# Patient Record
Sex: FEMALE | Race: WHITE | URBAN - NONMETROPOLITAN AREA
[De-identification: names, ages, dates, MRNs, and addresses within clinical notes are randomized per-mention and may not be internally consistent; named-entity substitution may affect disease eponyms.]

---

## 2017-11-25 LAB
BUN BLDV-MCNC: 13 MG/DL
CALCIUM SERPL-MCNC: 8.7 MG/DL
CHLORIDE BLD-SCNC: 101 MMOL/L
CO2: 27 MMOL/L
CREAT SERPL-MCNC: 0.8 MG/DL
CREATININE, URINE: 57
GFR CALCULATED: >60
GLUCOSE BLD-MCNC: 93 MG/DL
MICROALBUMIN/CREAT 24H UR: 8.9 MG/G{CREAT}
MICROALBUMIN/CREAT UR-RTO: 16
POTASSIUM SERPL-SCNC: 4.2 MMOL/L
SODIUM BLD-SCNC: 136 MMOL/L

## 2017-12-06 ENCOUNTER — OFFICE VISIT (OUTPATIENT)
Dept: NEPHROLOGY | Age: 52
End: 2017-12-06
Payer: COMMERCIAL

## 2017-12-06 VITALS
WEIGHT: 119.7 LBS | SYSTOLIC BLOOD PRESSURE: 114 MMHG | DIASTOLIC BLOOD PRESSURE: 68 MMHG | OXYGEN SATURATION: 98 % | HEART RATE: 84 BPM

## 2017-12-06 DIAGNOSIS — N02.0 IDIOPATHIC HEMATURIA WITH MINOR GLOMERULAR ABNORMALITY: Primary | ICD-10-CM

## 2017-12-06 PROCEDURE — 99213 OFFICE O/P EST LOW 20 MIN: CPT | Performed by: INTERNAL MEDICINE

## 2017-12-06 ASSESSMENT — ENCOUNTER SYMPTOMS
GASTROINTESTINAL NEGATIVE: 1
RESPIRATORY NEGATIVE: 1

## 2017-12-06 NOTE — PROGRESS NOTES
Visit Date: 12/6/2017      HPI:     Radha Gilliland is a 47 y.o. female who presents today for:  Chief Complaint   Patient presents with    Chronic Kidney Disease     Stage II    Hematuria       Current Outpatient Prescriptions   Medication Sig Dispense Refill    Norgestimate-Eth Estradiol (SPRINTEC 28 PO) Take  by mouth daily. No current facility-administered medications for this visit. Allergies   Allergen Reactions    Ibuprofen Other (See Comments)     Knocks her out and cannot wake up    Pcn [Penicillins]        Past Medical History:   Diagnosis Date    Hematuria       History reviewed. No pertinent surgical history. Family History   Problem Relation Age of Onset    Stroke Mother     Alzheimer's Disease Father      Social History   Substance Use Topics    Smoking status: Never Smoker    Smokeless tobacco: Never Used    Alcohol use Not on file        Subjective:      Review of Systems   Constitutional: Negative. HENT: Negative. Respiratory: Negative. Cardiovascular: Negative. Gastrointestinal: Negative. Genitourinary: Negative. Musculoskeletal: Negative. Skin: Negative. Neurological: Negative. Psychiatric/Behavioral: Negative. Objective:     /68 (Site: Left Arm, Position: Sitting)   Pulse 84   Wt 119 lb 11.2 oz (54.3 kg)   SpO2 98%     Physical Exam   Constitutional: She is oriented to person, place, and time. She appears well-developed and well-nourished. HENT:   Head: Normocephalic. Eyes: Right eye exhibits no discharge. Left eye exhibits no discharge. Neck: Normal range of motion. No JVD present. Cardiovascular: Normal rate, regular rhythm, normal heart sounds and intact distal pulses. Exam reveals no gallop and no friction rub. No murmur heard. Pulmonary/Chest: Effort normal and breath sounds normal. No respiratory distress. She has no wheezes. She has no rales. She exhibits no tenderness. Abdominal: Soft.  Bowel sounds are normal. She exhibits no distension. There is no tenderness. Musculoskeletal: Normal range of motion. She exhibits no edema or tenderness. Lymphadenopathy:     She has no cervical adenopathy. Neurological: She is alert and oriented to person, place, and time. No cranial nerve deficit. Coordination normal.   Skin: Skin is warm and dry. No rash noted. No pallor. Psychiatric: She has a normal mood and affect. Her behavior is normal. Judgment and thought content normal.   Nursing note and vitals reviewed. CBC: No results found for: WBC, HGB, HCT, MCV, PLT  BMP:    Lab Results   Component Value Date     11/25/2017     11/26/2016     11/21/2015    K 4.2 11/25/2017    K 4.2 11/26/2016    K 4.1 11/21/2015     11/25/2017     11/26/2016     11/21/2015    CO2 27 11/25/2017    CO2 30 11/26/2016    CO2 30 11/21/2015    BUN 13 11/25/2017    BUN 11 11/26/2016    BUN 11 11/21/2015    CREATININE 0.80 11/25/2017    CREATININE 0.81 11/26/2016    CREATININE 0.79 11/21/2015    GLUCOSE 93 11/25/2017    GLUCOSE 85 11/26/2016    GLUCOSE 89 11/21/2015      Hepatic: No results found for: AST, ALT, ALB, BILITOT, ALKPHOS  BNP: No results found for: BNP  Lipids: No results found for: CHOL, HDL  INR: No results found for: INR       URINE: No results found for: NAUR, PROTUR                          No results found for: NITRU, COLORU, PHUR, LABCAST, WBCUA, RBCUA, MUCUS, TRICHOMONAS, YEAST, BACTERIA, CLARITYU, SPECGRAV, LEUKOCYTESUR, UROBILINOGEN, BILIRUBINUR, BLOODU, GLUCOSEU, KETUA, AMORPHOUS      Microalbumen/Creatinine ratio:  No components found for: RUCREAT    Assessment:   Chronic kidney disease stage 2 with microscopic hematuria  IgA?   Thin basement membrane            Plan:   Fu annually   Urinalysis and serial labs  Discussed with patient        Jadyn Dominguez DO

## 2018-11-30 LAB
BUN BLDV-MCNC: 11 MG/DL
CALCIUM SERPL-MCNC: 9.4 MG/DL
CHLORIDE BLD-SCNC: 101 MMOL/L
CO2: 30 MMOL/L
CREAT SERPL-MCNC: 0.79 MG/DL
CREATININE, URINE: 52
GFR CALCULATED: >60
GLUCOSE BLD-MCNC: 96 MG/DL
MICROALBUMIN/CREAT 24H UR: 6.3 MG/G{CREAT}
MICROALBUMIN/CREAT UR-RTO: 12
POTASSIUM SERPL-SCNC: 4.7 MMOL/L
SODIUM BLD-SCNC: 138 MMOL/L

## 2018-12-05 ENCOUNTER — OFFICE VISIT (OUTPATIENT)
Dept: NEPHROLOGY | Age: 53
End: 2018-12-05
Payer: COMMERCIAL

## 2018-12-05 VITALS
OXYGEN SATURATION: 98 % | SYSTOLIC BLOOD PRESSURE: 120 MMHG | DIASTOLIC BLOOD PRESSURE: 80 MMHG | WEIGHT: 121.5 LBS | HEART RATE: 93 BPM

## 2018-12-05 DIAGNOSIS — R31.29 MICROSCOPIC HEMATURIA: Primary | ICD-10-CM

## 2018-12-05 PROBLEM — N18.2 STAGE 2 CHRONIC KIDNEY DISEASE: Status: ACTIVE | Noted: 2017-12-07

## 2018-12-05 PROCEDURE — 99213 OFFICE O/P EST LOW 20 MIN: CPT | Performed by: INTERNAL MEDICINE

## 2018-12-05 RX ORDER — ASCORBIC ACID 250 MG
TABLET ORAL
COMMUNITY

## 2019-11-26 LAB
BUN BLDV-MCNC: 10 MG/DL
CALCIUM SERPL-MCNC: 8.9 MG/DL
CHLORIDE BLD-SCNC: 101 MMOL/L
CO2: 30 MMOL/L
CREAT SERPL-MCNC: 0.85 MG/DL
GFR CALCULATED: >60
GLUCOSE BLD-MCNC: 101 MG/DL
POTASSIUM SERPL-SCNC: 4.7 MMOL/L
SODIUM BLD-SCNC: 138 MMOL/L

## 2019-12-04 ENCOUNTER — OFFICE VISIT (OUTPATIENT)
Dept: NEPHROLOGY | Age: 54
End: 2019-12-04
Payer: COMMERCIAL

## 2019-12-04 VITALS
DIASTOLIC BLOOD PRESSURE: 83 MMHG | OXYGEN SATURATION: 97 % | SYSTOLIC BLOOD PRESSURE: 129 MMHG | HEART RATE: 88 BPM | WEIGHT: 124 LBS

## 2019-12-04 DIAGNOSIS — R31.29 MICROSCOPIC HEMATURIA: Primary | ICD-10-CM

## 2019-12-04 DIAGNOSIS — N28.1 CYST OF RIGHT KIDNEY: ICD-10-CM

## 2019-12-04 PROCEDURE — 99213 OFFICE O/P EST LOW 20 MIN: CPT | Performed by: INTERNAL MEDICINE

## 2020-12-05 LAB
BILIRUBIN, URINE: NEGATIVE
BLOOD, URINE: POSITIVE
BUN BLDV-MCNC: 12 MG/DL
CALCIUM SERPL-MCNC: 9.2 MG/DL
CHLORIDE BLD-SCNC: 102 MMOL/L
CLARITY: CLEAR
CO2: 32 MMOL/L
COLOR: YELLOW
CREAT SERPL-MCNC: 0.65 MG/DL
GFR CALCULATED: >60
GLUCOSE BLD-MCNC: 96 MG/DL
GLUCOSE URINE: NEGATIVE
KETONES, URINE: NEGATIVE
LEUKOCYTE ESTERASE, URINE: POSITIVE
NITRITE, URINE: NEGATIVE
PH UA: 7 (ref 4.5–8)
POTASSIUM SERPL-SCNC: 4.1 MMOL/L
PROTEIN UA: NEGATIVE
SODIUM BLD-SCNC: 138 MMOL/L
SPECIFIC GRAVITY, URINE: 1.02
UROBILINOGEN, URINE: NORMAL

## 2020-12-16 ENCOUNTER — OFFICE VISIT (OUTPATIENT)
Dept: NEPHROLOGY | Age: 55
End: 2020-12-16
Payer: COMMERCIAL

## 2020-12-16 VITALS
HEART RATE: 90 BPM | SYSTOLIC BLOOD PRESSURE: 133 MMHG | TEMPERATURE: 98.2 F | OXYGEN SATURATION: 95 % | WEIGHT: 125.9 LBS | DIASTOLIC BLOOD PRESSURE: 86 MMHG

## 2020-12-16 PROCEDURE — 99212 OFFICE O/P EST SF 10 MIN: CPT | Performed by: INTERNAL MEDICINE

## 2020-12-16 NOTE — PROGRESS NOTES
+[facility]    [facility], [address]   [place] [facility]  [phone]  Progress Note  12/16/2020 12:47 PM      Pt Name:    Warden Hyde  YOB: 1965  Primary Care Physician:  Chelsi Ruggiero DO     Chief Complaint:   Chief Complaint   Patient presents with    Follow-up     MICROSCOPIC HEMATURIA        Background Information/Interval History:   64 yo female who hx microscopic hematuria and renal cyst who is here for yearly follow-up. She was previously following with Dr. Alena Duvall for microscopic hematuria. She is here for one year follow-up. She feels well. No chest pain or shortness of breath. No dysuria. No leg swelling. She feels well. Patient has been seeing Dr. Alena Duvall every year since 2014. I saw her for first time in 2018. Past History:  Past Medical History:   Diagnosis Date    Hematuria      History reviewed. No pertinent surgical history. VITALS:  /86 (Site: Right Upper Arm, Position: Sitting, Cuff Size: Small Adult)   Pulse 90   Temp 98.2 °F (36.8 °C)   Wt 125 lb 14.4 oz (57.1 kg)   SpO2 95%   Wt Readings from Last 3 Encounters:   12/16/20 125 lb 14.4 oz (57.1 kg)   12/04/19 124 lb (56.2 kg)   12/05/18 121 lb 8 oz (55.1 kg)     There is no height or weight on file to calculate BMI.      General Appearance: alert and cooperative with exam, appears comfortable, no distress  Oral: moist oral mucus membranes  Neck: No jugular venous distention  Lungs: Air entry B/L, no crackles or rales, no use of accessory muscles  Heart: S1, S2 heard  GI: soft, non-tender, no guarding  Extremities: No sig LE edema  Flank: No tenderness on palpation     Medications:  Current Outpatient Medications   Medication Sig Dispense Refill    zinc 50 MG CAPS Take 50 mg by mouth daily      VITAMIN D, CHOLECALCIFEROL, PO Take 3,000 Units by mouth daily      ascorbic acid (V-R VITAMIN C) 250 MG tablet Take by mouth      IRON PO Take by mouth      BEE POLLEN PO Take by mouth      Norgestimate-Eth Estradiol (SPRINTEC 28 PO) Take  by mouth daily. No current facility-administered medications for this visit. Laboratory & Diagnostics:  Dec 2019: B/L 10.6 cm, 3.3 cm Right renal cyst  Nov 2018: K 4.7, creat 0.79, UACR 12 mg/g    Nov 2019: K 4.7, creat 0.85, UA: 1+ blood, 0-2 RBC, urine cytology: negative  Dec 2020: K 4.1, Creat 0.65, UA: trace blood, 0-2 RBC     Impression/Plan:   1. Hx microscopic hematuria. Dipstick positive but 0-2 RBC/hpf last year and again this year. Discussed with patient that this is a very stable/normal finding. Reassurance provided. No proteinuria. Renal function is stable. Patient previously refused CT scan. US findings reviewed with patient. 2. Right renal cyst: asymptomatic. 3. Renal: creatinine is stable. OVerall renal function very stable. UA is normal. No RBC seen. Discussed with patient. She can follow with PCP but patient prefers to see me atleast on annual basis. Orders Placed This Encounter   Procedures    Basic Metabolic Panel    Urinalysis with Microscopic    Protein / creatinine ratio, urine     Return in about 1 year (around 12/16/2021).     Donna Arthur MD   [facility]

## 2021-12-04 LAB
BILIRUBIN, URINE: NEGATIVE
BLOOD, URINE: POSITIVE
BUN BLDV-MCNC: 12 MG/DL
CALCIUM SERPL-MCNC: 9.5 MG/DL
CHLORIDE BLD-SCNC: 102 MMOL/L
CLARITY: CLEAR
CO2: 27 MMOL/L
COLOR: YELLOW
CREAT SERPL-MCNC: 0.7 MG/DL
GFR CALCULATED: >60
GLUCOSE BLD-MCNC: 97 MG/DL
GLUCOSE URINE: NEGATIVE
KETONES, URINE: NEGATIVE
LEUKOCYTE ESTERASE, URINE: NEGATIVE
NITRITE, URINE: NEGATIVE
PH UA: 7.5 (ref 4.5–8)
POTASSIUM SERPL-SCNC: 4.5 MMOL/L
PROTEIN UA: NEGATIVE
SODIUM BLD-SCNC: 137 MMOL/L
SPECIFIC GRAVITY, URINE: 1.01
UROBILINOGEN, URINE: NORMAL

## 2021-12-15 ENCOUNTER — OFFICE VISIT (OUTPATIENT)
Dept: NEPHROLOGY | Age: 56
End: 2021-12-15
Payer: COMMERCIAL

## 2021-12-15 VITALS
DIASTOLIC BLOOD PRESSURE: 72 MMHG | HEART RATE: 93 BPM | SYSTOLIC BLOOD PRESSURE: 119 MMHG | WEIGHT: 124 LBS | TEMPERATURE: 97.5 F | OXYGEN SATURATION: 97 %

## 2021-12-15 DIAGNOSIS — R80.9 PROTEINURIA, UNSPECIFIED TYPE: Primary | ICD-10-CM

## 2021-12-15 DIAGNOSIS — R31.29 MICROSCOPIC HEMATURIA: ICD-10-CM

## 2021-12-15 PROCEDURE — 3017F COLORECTAL CA SCREEN DOC REV: CPT | Performed by: INTERNAL MEDICINE

## 2021-12-15 PROCEDURE — 99213 OFFICE O/P EST LOW 20 MIN: CPT | Performed by: INTERNAL MEDICINE

## 2021-12-15 PROCEDURE — G8484 FLU IMMUNIZE NO ADMIN: HCPCS | Performed by: INTERNAL MEDICINE

## 2021-12-15 PROCEDURE — G8428 CUR MEDS NOT DOCUMENT: HCPCS | Performed by: INTERNAL MEDICINE

## 2021-12-15 PROCEDURE — G8421 BMI NOT CALCULATED: HCPCS | Performed by: INTERNAL MEDICINE

## 2021-12-15 PROCEDURE — 1036F TOBACCO NON-USER: CPT | Performed by: INTERNAL MEDICINE

## 2021-12-15 NOTE — PROGRESS NOTES
12/15/2021)      BEE POLLEN PO Take by mouth (Patient not taking: Reported on 12/15/2021)      Norgestimate-Eth Estradiol (SPRINTEC 28 PO) Take  by mouth daily. (Patient not taking: Reported on 12/15/2021)       No current facility-administered medications for this visit. Laboratory & Diagnostics:  Dec 2019: B/L 10.6 cm, 3.3 cm Right renal cyst  Nov 2018: K 4.7, creat 0.79, UACR 12 mg/g    Nov 2019: K 4.7, creat 0.85, UA: 1+ blood, 0-2 RBC, urine cytology: negative  Dec 2020: K 4.1, Creat 0.65, UA: trace blood, 0-2 RBC  Dec 2021: K 4.5, Creat 0.7  UA: trace blood, 0-2 RBC, UPCR 500 mg/g     Impression/Plan:   1. Hx microscopic hematuria. Dipstick positive but 0-2 RBC/hpf last year and again this year. Reassurance provided. Discussed with patient thought process. Renal function is stable. However patient does have some proteinuria at this time. See below  2. Mild proteinuria: Dipstick negative. UPCR shows 500 mg/g. Will repeat UPCR in 3 months. Check SPEP and ROBERTO. Check UA. Advised low salt diet. Patient is not taking any nonsteroidals. Blood pressure is stable. Will follow repeat labs in 3 months. If persistent, then will initiate full serological evaluation  3. Right renal cyst: asymptomatic. Benign cyst.     Orders Placed This Encounter   Procedures    Basic Metabolic Panel    Urinalysis    Protein / creatinine ratio, urine    Electrophoresis Protein, Serum without Reflex to Immunofixation    Immunofixation electrophoresis    CBC     Return in about 1 year (around 12/15/2022).     Margie Flores MD   [facility]

## 2022-03-23 ENCOUNTER — TELEPHONE (OUTPATIENT)
Dept: NEPHROLOGY | Age: 57
End: 2022-03-23

## 2022-03-23 DIAGNOSIS — R80.9 PROTEINURIA, UNSPECIFIED TYPE: Primary | ICD-10-CM

## 2022-03-23 NOTE — TELEPHONE ENCOUNTER
Left message informing pt to that doctor wants her to some labs done in 3 months and asked for a call back to confirm that she got the message. Lab orders pending. She already has an order for SPEP and ROBERTO.

## 2022-11-29 LAB
BUN BLDV-MCNC: 13 MG/DL
CALCIUM SERPL-MCNC: 9.3 MG/DL
CHLORIDE BLD-SCNC: 100 MMOL/L
CO2: 27 MMOL/L
CREAT SERPL-MCNC: 0.9 MG/DL
GFR CALCULATED: 74
GLUCOSE BLD-MCNC: 90 MG/DL
POTASSIUM SERPL-SCNC: 4.2 MMOL/L
SODIUM BLD-SCNC: 137 MMOL/L

## 2022-12-07 ENCOUNTER — OFFICE VISIT (OUTPATIENT)
Dept: NEPHROLOGY | Age: 57
End: 2022-12-07
Payer: COMMERCIAL

## 2022-12-07 VITALS
WEIGHT: 122 LBS | TEMPERATURE: 98 F | HEART RATE: 89 BPM | DIASTOLIC BLOOD PRESSURE: 74 MMHG | SYSTOLIC BLOOD PRESSURE: 121 MMHG | OXYGEN SATURATION: 96 %

## 2022-12-07 DIAGNOSIS — N28.1 RENAL CYST: ICD-10-CM

## 2022-12-07 DIAGNOSIS — R31.29 MICROSCOPIC HEMATURIA: Primary | ICD-10-CM

## 2022-12-07 PROCEDURE — 99213 OFFICE O/P EST LOW 20 MIN: CPT | Performed by: INTERNAL MEDICINE

## 2022-12-07 PROCEDURE — 1036F TOBACCO NON-USER: CPT | Performed by: INTERNAL MEDICINE

## 2022-12-07 PROCEDURE — G8484 FLU IMMUNIZE NO ADMIN: HCPCS | Performed by: INTERNAL MEDICINE

## 2022-12-07 PROCEDURE — G8421 BMI NOT CALCULATED: HCPCS | Performed by: INTERNAL MEDICINE

## 2022-12-07 PROCEDURE — G8427 DOCREV CUR MEDS BY ELIG CLIN: HCPCS | Performed by: INTERNAL MEDICINE

## 2022-12-07 PROCEDURE — 3017F COLORECTAL CA SCREEN DOC REV: CPT | Performed by: INTERNAL MEDICINE

## 2022-12-07 NOTE — PROGRESS NOTES
[facility]    [facility], [address]   [place] [facility]  [phone]  Progress Note  12/7/2022       Pt Name:    Mina Rank  YOB: 1965  Primary Care Physician:  Jessica Degroot DO     Chief Complaint:   Chief Complaint   Patient presents with    Follow-up     1 year for microscopic hematuria        Background Information/Interval History:   60 yo female who hx microscopic hematuria and renal cyst who is here for yearly follow-up. She was previously following with Dr. Frank Hampton for microscopic hematuria. She is here for 1 year follow-up. No complaints. No leg swelling. No urinary complaints. Past History:  Past Medical History:   Diagnosis Date    Hematuria      No past surgical history on file. VITALS:  /74 (Site: Left Upper Arm, Position: Sitting, Cuff Size: Medium Adult)   Pulse 89   Temp 98 °F (36.7 °C)   Wt 122 lb (55.3 kg)   SpO2 96%   Wt Readings from Last 3 Encounters:   12/07/22 122 lb (55.3 kg)   12/15/21 124 lb (56.2 kg)   12/16/20 125 lb 14.4 oz (57.1 kg)     There is no height or weight on file to calculate BMI. General Appearance: alert and cooperative with exam, appears comfortable, no distress  Lungs: Air entry B/L, no crackles or rales, no use of accessory muscles  Heart: S1, S2 heard  Extremities: No sig LE edema  Flank: No tenderness on palpation. Medications:  Current Outpatient Medications   Medication Sig Dispense Refill    NONFORMULARY K and L support      Multiple Vitamin (MULTIVITAMIN ADULT PO) Take by mouth       No current facility-administered medications for this visit.         Laboratory & Diagnostics:  Dec 2019: B/L 10.6 cm, 3.3 cm Right renal cyst  Nov 2018: K 4.7, creat 0.79, UACR 12 mg/g    Nov 2019: K 4.7, creat 0.85, UA: 1+ blood, 0-2 RBC, urine cytology: negative  Dec 2020: K 4.1, Creat 0.65, UA: trace blood, 0-2 RBC  Dec 2021: K 4.5, Creat 0.7  UA: trace blood, 0-2 RBC, UPCR 500 mg/g    ROBERTO: no monoclonal protein  July 2022: UPCR 600 mg/g, UA: no protein, trace blood, 0-2 RBC  C3 114, C4 18  Nov 2022: K 4.2, creat 0.9     Impression/Plan:   1. Hx microscopic hematuria. Dipstick positive but 0-2 RBC/hpf last year and again this year. Reassurance provided. Stable. UA in July 2022 showed 0-2 RBC again. Overall stable. No intervention at this time. 2. Mild proteinuria: Dipstick negative. UPCR shows 600 mg/g. SARIKA negative, Complements normal.  ROBERTO negative. 3. Right renal cyst: asymptomatic. Benign cyst.     Orders Placed This Encounter   Procedures    Basic Metabolic Panel    Urinalysis    Protein / creatinine ratio, urine    Anti-Neutrophilic Cytoplasmic Antibody    Glomerular Basement Membrane (GBM) Antibody IgG     Return in about 1 year (around 12/7/2023).         Emilie Smart MD   [facility]

## 2023-11-29 LAB
BILIRUBIN, URINE: NEGATIVE
BLOOD, URINE: NEGATIVE
BUN BLDV-MCNC: 13 MG/DL
CALCIUM SERPL-MCNC: 9.3 MG/DL
CHLORIDE BLD-SCNC: 100 MMOL/L
CLARITY: CLEAR
CO2: 27 MMOL/L
COLOR: YELLOW
CREAT SERPL-MCNC: 0.9 MG/DL
EGFR: 74
GLUCOSE BLD-MCNC: 90 MG/DL
GLUCOSE URINE: NEGATIVE
KETONES, URINE: NEGATIVE
LEUKOCYTE ESTERASE, URINE: NORMAL
NITRITE, URINE: NEGATIVE
PH UA: 7 (ref 4.5–8)
POTASSIUM SERPL-SCNC: 4.2 MMOL/L
PROTEIN UA: NEGATIVE
SODIUM BLD-SCNC: 137 MMOL/L
SPECIFIC GRAVITY, URINE: 1.01
UROBILINOGEN, URINE: NORMAL

## 2023-12-06 ENCOUNTER — OFFICE VISIT (OUTPATIENT)
Dept: NEPHROLOGY | Age: 58
End: 2023-12-06
Payer: COMMERCIAL

## 2023-12-06 VITALS
WEIGHT: 124 LBS | HEART RATE: 84 BPM | OXYGEN SATURATION: 98 % | BODY MASS INDEX: 22.82 KG/M2 | SYSTOLIC BLOOD PRESSURE: 120 MMHG | HEIGHT: 62 IN | DIASTOLIC BLOOD PRESSURE: 73 MMHG

## 2023-12-06 DIAGNOSIS — R80.9 PROTEINURIA, UNSPECIFIED TYPE: Primary | ICD-10-CM

## 2023-12-06 DIAGNOSIS — N28.1 RENAL CYST: ICD-10-CM

## 2023-12-06 PROCEDURE — 3017F COLORECTAL CA SCREEN DOC REV: CPT | Performed by: INTERNAL MEDICINE

## 2023-12-06 PROCEDURE — 99213 OFFICE O/P EST LOW 20 MIN: CPT | Performed by: INTERNAL MEDICINE

## 2023-12-06 PROCEDURE — G8420 CALC BMI NORM PARAMETERS: HCPCS | Performed by: INTERNAL MEDICINE

## 2023-12-06 PROCEDURE — G8484 FLU IMMUNIZE NO ADMIN: HCPCS | Performed by: INTERNAL MEDICINE

## 2023-12-06 PROCEDURE — 1036F TOBACCO NON-USER: CPT | Performed by: INTERNAL MEDICINE

## 2023-12-06 PROCEDURE — G8427 DOCREV CUR MEDS BY ELIG CLIN: HCPCS | Performed by: INTERNAL MEDICINE

## 2024-12-04 ENCOUNTER — OFFICE VISIT (OUTPATIENT)
Dept: NEPHROLOGY | Age: 59
End: 2024-12-04
Payer: COMMERCIAL

## 2024-12-04 VITALS
WEIGHT: 121 LBS | OXYGEN SATURATION: 96 % | HEART RATE: 85 BPM | BODY MASS INDEX: 22.26 KG/M2 | HEIGHT: 62 IN | DIASTOLIC BLOOD PRESSURE: 80 MMHG | SYSTOLIC BLOOD PRESSURE: 131 MMHG

## 2024-12-04 DIAGNOSIS — N28.1 RENAL CYST: ICD-10-CM

## 2024-12-04 DIAGNOSIS — R31.29 MICROSCOPIC HEMATURIA: Primary | ICD-10-CM

## 2024-12-04 PROCEDURE — G8484 FLU IMMUNIZE NO ADMIN: HCPCS | Performed by: INTERNAL MEDICINE

## 2024-12-04 PROCEDURE — 3017F COLORECTAL CA SCREEN DOC REV: CPT | Performed by: INTERNAL MEDICINE

## 2024-12-04 PROCEDURE — 1036F TOBACCO NON-USER: CPT | Performed by: INTERNAL MEDICINE

## 2024-12-04 PROCEDURE — G8420 CALC BMI NORM PARAMETERS: HCPCS | Performed by: INTERNAL MEDICINE

## 2024-12-04 PROCEDURE — G8427 DOCREV CUR MEDS BY ELIG CLIN: HCPCS | Performed by: INTERNAL MEDICINE

## 2024-12-04 PROCEDURE — 99213 OFFICE O/P EST LOW 20 MIN: CPT | Performed by: INTERNAL MEDICINE

## 2024-12-04 NOTE — PROGRESS NOTES
[facility]    [address] [place] [address], [address]   [place] [address]  [phone]  Progress Note  12/4/2024       Pt Name:    Kriss Soto  YOB: 1965  Primary Care Physician:  Minesh Reveles DO     Chief Complaint:   Chief Complaint   Patient presents with    Follow-up     1 year for microscopic hematuria  Chief Complaint   Patient presents with    Follow-up     Proteinuria, unspecified type             Background Information/Interval History:   58 yo female who hx microscopic hematuria and renal cyst who is here for yearly follow-up. She was previously following with Dr. Toledo for microscopic hematuria.     She is here for 1 year follow-up. No gross hematuria. No chest pain or shortness of breath. No leg swelling. Feels okay.      Past History:  Past Medical History:   Diagnosis Date    Hematuria      No past surgical history on file.     VITALS:  /80 (Site: Left Upper Arm, Position: Sitting, Cuff Size: Medium Adult)   Pulse 85   Ht 1.575 m (5' 2\")   Wt 54.9 kg (121 lb)   SpO2 96%   BMI 22.13 kg/m²   Wt Readings from Last 3 Encounters:   12/04/24 54.9 kg (121 lb)   12/06/23 56.2 kg (124 lb)   12/07/22 55.3 kg (122 lb)     Body mass index is 22.13 kg/m².     General Appearance: alert and cooperative with exam, appears comfortable, no distress  Lungs: Air entry B/L, no crackles or rales, no use of accessory muscles  Heart: S1, S2 heard  Extremities: No sig LE edema  Flank: No tenderness on palpation.     Medications:  Current Outpatient Medications   Medication Sig Dispense Refill    NONFORMULARY K and L support      Multiple Vitamin (MULTIVITAMIN ADULT PO) Take by mouth       No current facility-administered medications for this visit.        Laboratory & Diagnostics:  Dec 2019: B/L 10.6 cm, 3.3 cm Right renal cyst  Nov 2018: K 4.7, creat 0.79, UACR 12 mg/g    Nov 2019: K 4.7, creat 0.85, UA: 1+ blood, 0-2 RBC, urine cytology: negative  Dec 2020: K 4.1, Creat